# Patient Record
Sex: MALE | Race: WHITE | NOT HISPANIC OR LATINO | URBAN - METROPOLITAN AREA
[De-identification: names, ages, dates, MRNs, and addresses within clinical notes are randomized per-mention and may not be internally consistent; named-entity substitution may affect disease eponyms.]

---
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
## 2021-05-16 VITALS
TEMPERATURE: 98.7 F | HEART RATE: 103 BPM | WEIGHT: 255.25 LBS | BODY MASS INDEX: 42.53 KG/M2 | OXYGEN SATURATION: 96 % | HEIGHT: 65 IN | RESPIRATION RATE: 15 BRPM

## 2021-05-16 VITALS — BODY MASS INDEX: 42.68 KG/M2 | HEIGHT: 64 IN | WEIGHT: 250 LBS | TEMPERATURE: 97.9 F

## 2021-05-16 VITALS — WEIGHT: 256 LBS | TEMPERATURE: 98.5 F | RESPIRATION RATE: 16 BRPM | HEIGHT: 65 IN | BODY MASS INDEX: 42.65 KG/M2

## 2023-05-27 ENCOUNTER — APPOINTMENT (OUTPATIENT)
Dept: GENERAL RADIOLOGY | Facility: HOSPITAL | Age: 23
End: 2023-05-27
Payer: COMMERCIAL

## 2023-05-27 ENCOUNTER — APPOINTMENT (OUTPATIENT)
Dept: CT IMAGING | Facility: HOSPITAL | Age: 23
End: 2023-05-27
Payer: COMMERCIAL

## 2023-05-27 ENCOUNTER — HOSPITAL ENCOUNTER (EMERGENCY)
Facility: HOSPITAL | Age: 23
Discharge: HOME | End: 2023-05-27
Attending: EMERGENCY MEDICINE
Payer: COMMERCIAL

## 2023-05-27 VITALS
OXYGEN SATURATION: 98 % | HEART RATE: 86 BPM | HEIGHT: 65 IN | TEMPERATURE: 98.6 F | SYSTOLIC BLOOD PRESSURE: 123 MMHG | RESPIRATION RATE: 18 BRPM | WEIGHT: 240.52 LBS | DIASTOLIC BLOOD PRESSURE: 67 MMHG | BODY MASS INDEX: 40.07 KG/M2

## 2023-05-27 DIAGNOSIS — R55 SYNCOPE, UNSPECIFIED SYNCOPE TYPE: Primary | ICD-10-CM

## 2023-05-27 LAB
ALBUMIN SERPL-MCNC: 3.9 G/DL (ref 3.5–5.2)
ALBUMIN/GLOB SERPL: 1.3 G/DL
ALP SERPL-CCNC: 68 U/L (ref 39–117)
ALT SERPL W P-5'-P-CCNC: 41 U/L (ref 1–41)
AMPHET+METHAMPHET UR QL: NEGATIVE
ANION GAP SERPL CALCULATED.3IONS-SCNC: 10 MMOL/L (ref 5–15)
AST SERPL-CCNC: 37 U/L (ref 1–40)
BARBITURATES UR QL SCN: NEGATIVE
BASOPHILS # BLD AUTO: 0.01 10*3/MM3 (ref 0–0.2)
BASOPHILS NFR BLD AUTO: 0.1 % (ref 0–1.5)
BENZODIAZ UR QL SCN: NEGATIVE
BILIRUB SERPL-MCNC: 0.5 MG/DL (ref 0–1.2)
BILIRUB UR QL STRIP: NEGATIVE
BUN SERPL-MCNC: 14 MG/DL (ref 6–20)
BUN/CREAT SERPL: 13.7 (ref 7–25)
CALCIUM SPEC-SCNC: 9.3 MG/DL (ref 8.6–10.5)
CANNABINOIDS SERPL QL: NEGATIVE
CHLORIDE SERPL-SCNC: 104 MMOL/L (ref 98–107)
CLARITY UR: CLEAR
CO2 SERPL-SCNC: 25 MMOL/L (ref 22–29)
COCAINE UR QL: NEGATIVE
COLOR UR: YELLOW
CREAT SERPL-MCNC: 1.02 MG/DL (ref 0.76–1.27)
D DIMER PPP FEU-MCNC: 3.72 MCGFEU/ML (ref 0–0.5)
DEPRECATED RDW RBC AUTO: 38.4 FL (ref 37–54)
EGFRCR SERPLBLD CKD-EPI 2021: 107.2 ML/MIN/1.73
EOSINOPHIL # BLD AUTO: 0.01 10*3/MM3 (ref 0–0.4)
EOSINOPHIL NFR BLD AUTO: 0.1 % (ref 0.3–6.2)
ERYTHROCYTE [DISTWIDTH] IN BLOOD BY AUTOMATED COUNT: 12.8 % (ref 12.3–15.4)
FENTANYL UR-MCNC: NEGATIVE NG/ML
GLOBULIN UR ELPH-MCNC: 3.1 GM/DL
GLUCOSE SERPL-MCNC: 96 MG/DL (ref 65–99)
GLUCOSE UR STRIP-MCNC: NEGATIVE MG/DL
HCT VFR BLD AUTO: 47.1 % (ref 37.5–51)
HGB BLD-MCNC: 16.6 G/DL (ref 13–17.7)
HGB UR QL STRIP.AUTO: NEGATIVE
HOLD SPECIMEN: NORMAL
HOLD SPECIMEN: NORMAL
IMM GRANULOCYTES # BLD AUTO: 0.03 10*3/MM3 (ref 0–0.05)
IMM GRANULOCYTES NFR BLD AUTO: 0.3 % (ref 0–0.5)
KETONES UR QL STRIP: ABNORMAL
LEUKOCYTE ESTERASE UR QL STRIP.AUTO: NEGATIVE
LYMPHOCYTES # BLD AUTO: 2.29 10*3/MM3 (ref 0.7–3.1)
LYMPHOCYTES NFR BLD AUTO: 23.8 % (ref 19.6–45.3)
MAGNESIUM SERPL-MCNC: 2 MG/DL (ref 1.6–2.6)
MCH RBC QN AUTO: 29.2 PG (ref 26.6–33)
MCHC RBC AUTO-ENTMCNC: 35.2 G/DL (ref 31.5–35.7)
MCV RBC AUTO: 82.9 FL (ref 79–97)
METHADONE UR QL SCN: NEGATIVE
MONOCYTES # BLD AUTO: 0.38 10*3/MM3 (ref 0.1–0.9)
MONOCYTES NFR BLD AUTO: 3.9 % (ref 5–12)
NEUTROPHILS NFR BLD AUTO: 6.91 10*3/MM3 (ref 1.7–7)
NEUTROPHILS NFR BLD AUTO: 71.8 % (ref 42.7–76)
NITRITE UR QL STRIP: NEGATIVE
NRBC BLD AUTO-RTO: 0 /100 WBC (ref 0–0.2)
OPIATES UR QL: NEGATIVE
OXYCODONE UR QL SCN: NEGATIVE
PH UR STRIP.AUTO: 6 [PH] (ref 5–8)
PLATELET # BLD AUTO: 251 10*3/MM3 (ref 140–450)
PMV BLD AUTO: 9.3 FL (ref 6–12)
POTASSIUM SERPL-SCNC: 3.8 MMOL/L (ref 3.5–5.2)
PROT SERPL-MCNC: 7 G/DL (ref 6–8.5)
PROT UR QL STRIP: NEGATIVE
QT INTERVAL: 374 MS
QT INTERVAL: 379 MS
RBC # BLD AUTO: 5.68 10*6/MM3 (ref 4.14–5.8)
SODIUM SERPL-SCNC: 139 MMOL/L (ref 136–145)
SP GR UR STRIP: 1.02 (ref 1–1.03)
TROPONIN T SERPL HS-MCNC: 15 NG/L
TROPONIN T SERPL HS-MCNC: 19 NG/L
UROBILINOGEN UR QL STRIP: ABNORMAL
WBC NRBC COR # BLD: 9.63 10*3/MM3 (ref 3.4–10.8)
WHOLE BLOOD HOLD COAG: NORMAL
WHOLE BLOOD HOLD SPECIMEN: NORMAL

## 2023-05-27 PROCEDURE — 84484 ASSAY OF TROPONIN QUANT: CPT | Performed by: EMERGENCY MEDICINE

## 2023-05-27 PROCEDURE — 81003 URINALYSIS AUTO W/O SCOPE: CPT

## 2023-05-27 PROCEDURE — 80053 COMPREHEN METABOLIC PANEL: CPT | Performed by: EMERGENCY MEDICINE

## 2023-05-27 PROCEDURE — 71045 X-RAY EXAM CHEST 1 VIEW: CPT

## 2023-05-27 PROCEDURE — 80307 DRUG TEST PRSMV CHEM ANLYZR: CPT

## 2023-05-27 PROCEDURE — 36415 COLL VENOUS BLD VENIPUNCTURE: CPT

## 2023-05-27 PROCEDURE — 93005 ELECTROCARDIOGRAM TRACING: CPT

## 2023-05-27 PROCEDURE — 25510000001 IOPAMIDOL PER 1 ML: Performed by: EMERGENCY MEDICINE

## 2023-05-27 PROCEDURE — 93005 ELECTROCARDIOGRAM TRACING: CPT | Performed by: EMERGENCY MEDICINE

## 2023-05-27 PROCEDURE — 85025 COMPLETE CBC W/AUTO DIFF WBC: CPT | Performed by: EMERGENCY MEDICINE

## 2023-05-27 PROCEDURE — 83735 ASSAY OF MAGNESIUM: CPT | Performed by: EMERGENCY MEDICINE

## 2023-05-27 PROCEDURE — 99284 EMERGENCY DEPT VISIT MOD MDM: CPT

## 2023-05-27 PROCEDURE — 84484 ASSAY OF TROPONIN QUANT: CPT

## 2023-05-27 PROCEDURE — 85379 FIBRIN DEGRADATION QUANT: CPT

## 2023-05-27 PROCEDURE — 71260 CT THORAX DX C+: CPT

## 2023-05-27 RX ORDER — SODIUM CHLORIDE 0.9 % (FLUSH) 0.9 %
10 SYRINGE (ML) INJECTION AS NEEDED
Status: DISCONTINUED | OUTPATIENT
Start: 2023-05-27 | End: 2023-05-27 | Stop reason: HOSPADM

## 2023-05-27 RX ADMIN — SODIUM CHLORIDE 1000 ML: 9 INJECTION, SOLUTION INTRAVENOUS at 12:31

## 2023-05-27 RX ADMIN — IOPAMIDOL 100 ML: 755 INJECTION, SOLUTION INTRAVENOUS at 17:33

## 2023-05-27 NOTE — ED PROVIDER NOTES
Time: 12:10 PM EDT  Date of encounter:  5/27/2023  Independent Historian/Clinical History and Information was obtained by:   Patient  Chief Complaint   Patient presents with   • Syncope       History is limited by: N/A    History of Present Illness:  Patient is a 22 y.o. year old male who presents to the emergency department for evaluation of syncopal episode that occurred today.  Patient states that he originally was going to the restroom having a bowel movement and when he sat down he got dizzy.  He denies straining or being constipated.  He states that afterwards he stood up and he was fine but then when he was walking he felt lightheaded again.  Patient states that he yelled for his dad once he was outside by his truck and his dad came over and he felt lightheaded and his father states that he had a syncopal episode where caught him and lowered him to the ground. Father states he was unconscious for maybe 5 seconds and that he looked pale prior to.  He did not hit his head.  Patient states that this has not happened in the past.  He does have a history of sleep apnea but does not use a breathing machine.  He denies alcohol or drug use.  He denies nausea or vomiting  HPI    Patient Care Team  Primary Care Provider: Provider, No Known    Past Medical History:     No Known Allergies  History reviewed. No pertinent past medical history.  History reviewed. No pertinent surgical history.  History reviewed. No pertinent family history.    Home Medications:  Prior to Admission medications    Not on File        Social History:          Review of Systems:  Review of Systems   Constitutional: Negative.    HENT: Negative.    Eyes: Negative.    Respiratory: Negative.    Cardiovascular: Negative.    Gastrointestinal: Negative.  Negative for nausea and vomiting.   Endocrine: Negative.    Genitourinary: Negative.    Musculoskeletal: Negative.    Skin: Negative.    Allergic/Immunologic: Negative.    Neurological: Positive for  "dizziness and syncope.   Hematological: Negative.    Psychiatric/Behavioral: Negative.         Physical Exam:  /67   Pulse 66   Temp 98.6 °F (37 °C) (Oral)   Resp 18   Ht 165.1 cm (65\")   Wt 109 kg (240 lb 8.4 oz)   SpO2 94%   BMI 40.02 kg/m²     Physical Exam  Vitals and nursing note reviewed.   Constitutional:       Appearance: Normal appearance. He is obese.   HENT:      Head: Normocephalic and atraumatic.      Nose: Nose normal.      Mouth/Throat:      Mouth: Mucous membranes are moist.   Eyes:      Extraocular Movements: Extraocular movements intact.      Conjunctiva/sclera: Conjunctivae normal.      Pupils: Pupils are equal, round, and reactive to light.   Cardiovascular:      Rate and Rhythm: Normal rate and regular rhythm.      Heart sounds: Normal heart sounds.   Pulmonary:      Effort: Pulmonary effort is normal.      Breath sounds: Normal breath sounds.   Musculoskeletal:         General: Normal range of motion.      Cervical back: Normal range of motion and neck supple.   Skin:     General: Skin is warm and dry.   Neurological:      General: No focal deficit present.      Mental Status: He is alert and oriented to person, place, and time.   Psychiatric:         Mood and Affect: Mood normal.         Behavior: Behavior normal.                  Procedures:  Procedures      Medical Decision Making:      Comorbidities that affect care:    Obesity    External Notes reviewed:    Previous Clinic Note: ENT office visit for sleep apnea management      The following orders were placed and all results were independently analyzed by me:  Orders Placed This Encounter   Procedures   • XR Chest 1 View   • CT Chest With Contrast Diagnostic   • [place] Draw   • Comprehensive Metabolic Panel   • Magnesium   • Single High Sensitivity Troponin T   • CBC Auto Differential   • Urinalysis With Microscopic If Indicated (No Culture) - Urine, Clean Catch   • Urine Drug Screen - Urine, Clean Catch   • Single High " Sensitivity Troponin T   • D-dimer, Quantitative   • NPO Diet NPO Type: Strict NPO   • Undress & Gown   • Continuous Pulse Oximetry   • Vital Signs   • Orthostatic Blood Pressure   • Oxygen Therapy- Nasal Cannula; Titrate 1-6 LPM Per SpO2; 90 - 95%   • POC Glucose Once   • ECG 12 Lead ED Triage Standing Order; Syncope   • ECG 12 Lead Other; elevated trop   • Insert Peripheral IV   • CBC & Differential   • Green Top (Gel)   • Lavender Top   • Gold Top - SST   • Light Blue Top       Medications Given in the Emergency Department:  Medications   sodium chloride 0.9 % flush 10 mL (has no administration in time range)   sodium chloride 0.9 % bolus 1,000 mL (0 mL Intravenous Stopped 5/27/23 1301)   iopamidol (ISOVUE-370) 76 % injection 100 mL (100 mL Intravenous Given 5/27/23 1733)        ED Course:    The patient was initially evaluated in the triage area where orders were placed. The patient was later dispositioned by Santo Webber MD.      The patient was advised to stay for completion of workup which includes but is not limited to communication of labs and radiological results, reassessment and plan. The patient was advised that leaving prior to disposition by a provider could result in critical findings that are not communicated to the patient.     ED Course as of 05/27/23 1755   Sat May 27, 2023   1154 Single High Sensitivity Troponin T [AJ]   1521 Discussed elevated Trop with Dr. Larson, he recommends a D-dimer. [AJ]   1521 POC Glucose Once [AJ]   1634 Ddimer elevated at 3.72, CT chest ordered. Spoke to the patient and his dad about next steps.  [AJ]      ED Course User Index  [AJ] Jose G Yanez, AZAM       Labs:    Lab Results (last 24 hours)     Procedure Component Value Units Date/Time    CBC & Differential [[id number]]  (Abnormal) Collected: 05/27/23 1020    Specimen: Blood Updated: 05/27/23 1030    Narrative:      The following orders were created for panel order CBC & Differential.  Procedure                                Abnormality         Status                     ---------                               -----------         ------                     CBC Auto Differential[[id number]]        Abnormal            Final result                 Please view results for these tests on the individual orders.    CBC Auto Differential [[id number]]  (Abnormal) Collected: 05/27/23 1020    Specimen: Blood Updated: 05/27/23 1030     WBC 9.63 10*3/mm3      RBC 5.68 10*6/mm3      Hemoglobin 16.6 g/dL      Hematocrit 47.1 %      MCV 82.9 fL      MCH 29.2 pg      MCHC 35.2 g/dL      RDW 12.8 %      RDW-SD 38.4 fl      MPV 9.3 fL      Platelets 251 10*3/mm3      Neutrophil % 71.8 %      Lymphocyte % 23.8 %      Monocyte % 3.9 %      Eosinophil % 0.1 %      Basophil % 0.1 %      Immature Grans % 0.3 %      Neutrophils, Absolute 6.91 10*3/mm3      Lymphocytes, Absolute 2.29 10*3/mm3      Monocytes, Absolute 0.38 10*3/mm3      Eosinophils, Absolute 0.01 10*3/mm3      Basophils, Absolute 0.01 10*3/mm3      Immature Grans, Absolute 0.03 10*3/mm3      nRBC 0.0 /100 WBC     D-dimer, Quantitative [[id number]]  (Abnormal) Collected: 05/27/23 1020    Specimen: Blood Updated: 05/27/23 1629     D-Dimer, Quantitative 3.72 MCGFEU/mL     Narrative:      According to the assay 's published package insert, a normal (<0.50 MCGFEU/mL) D-dimer result in conjunction with a non-high clinical probability assessment, excludes deep vein thrombosis (DVT) and pulmonary embolism (PE) with high sensitivity.    D-dimer values increase with age and this can make VTE exclusion of an older population difficult. To address this, the American College of [employer], based on best available evidence and recent guidelines, recommends that clinicians use age-adjusted D-dimer thresholds in patients greater than 50 years of age with: a) a low probability of PE who do not meet all Pulmonary Embolism Rule Out Criteria, or b) in those with intermediate probability of  "PE.   The formula for an age-adjusted D-dimer cut-off is \"age/100\".  For example, a 60 year old patient would have an age-adjusted cut-off of 0.60 MCGFEU/mL and an 80 year old 0.80 MCGFEU/mL.    Comprehensive Metabolic Panel [[id number]] Collected: 05/27/23 1111    Specimen: Blood Updated: 05/27/23 1143     Glucose 96 mg/dL      BUN 14 mg/dL      Creatinine 1.02 mg/dL      Sodium 139 mmol/L      Potassium 3.8 mmol/L      Chloride 104 mmol/L      CO2 25.0 mmol/L      Calcium 9.3 mg/dL      Total Protein 7.0 g/dL      Albumin 3.9 g/dL      ALT (SGPT) 41 U/L      AST (SGOT) 37 U/L      Alkaline Phosphatase 68 U/L      Total Bilirubin 0.5 mg/dL      Globulin 3.1 gm/dL      A/G Ratio 1.3 g/dL      BUN/Creatinine Ratio 13.7     Anion Gap 10.0 mmol/L      eGFR 107.2 mL/min/1.73     Narrative:      GFR Normal >60  Chronic Kidney Disease <60  Kidney Failure <15      Magnesium [[id number]]  (Normal) Collected: 05/27/23 1111    Specimen: Blood Updated: 05/27/23 1143     Magnesium 2.0 mg/dL     Single High Sensitivity Troponin T [[id number]]  (Abnormal) Collected: 05/27/23 1111    Specimen: Blood Updated: 05/27/23 1143     HS Troponin T 15 ng/L     Narrative:      High Sensitive Troponin T Reference Range:  <10.0 ng/L- Negative Female for AMI  <15.0 ng/L- Negative Male for AMI  >=10 - Abnormal Female indicating possible myocardial injury.  >=15 - Abnormal Male indicating possible myocardial injury.   Clinicians would have to utilize clinical acumen, EKG, Troponin, and serial changes to determine if it is an Acute Myocardial Infarction or myocardial injury due to an underlying chronic condition.         Single High Sensitivity Troponin T [[id number]]  (Abnormal) Collected: 05/27/23 1324    Specimen: Blood Updated: 05/27/23 1359     HS Troponin T 19 ng/L     Narrative:      High Sensitive Troponin T Reference Range:  <10.0 ng/L- Negative Female for AMI  <15.0 ng/L- Negative Male for AMI  >=10 - Abnormal Female indicating possible " myocardial injury.  >=15 - Abnormal Male indicating possible myocardial injury.   Clinicians would have to utilize clinical acumen, EKG, Troponin, and serial changes to determine if it is an Acute Myocardial Infarction or myocardial injury due to an underlying chronic condition.         Urinalysis With Microscopic If Indicated (No Culture) - Urine, Clean Catch [[id number]]  (Abnormal) Collected: 05/27/23 1325    Specimen: Urine, Clean Catch Updated: 05/27/23 1354     Color, UA Yellow     Appearance, UA Clear     pH, UA 6.0     Specific Gravity, UA 1.022     Glucose, UA Negative     Ketones, UA Trace     Bilirubin, UA Negative     Blood, UA Negative     Protein, UA Negative     Leuk Esterase, UA Negative     Nitrite, UA Negative     Urobilinogen, UA 1.0 E.U./dL    Narrative:      Urine microscopic not indicated.    Urine Drug Screen - Urine, Clean Catch [[id number]]  (Normal) Collected: 05/27/23 1325    Specimen: Urine, Clean Catch Updated: 05/27/23 1407     Amphet/Methamphet, Screen Negative     Barbiturates Screen, Urine Negative     Benzodiazepine Screen, Urine Negative     Cocaine Screen, Urine Negative     Opiate Screen Negative     THC, Screen, Urine Negative     Methadone Screen, Urine Negative     Oxycodone Screen, Urine Negative     Fentanyl, Urine Negative    Narrative:      Negative Thresholds Per Drugs Screened:    Amphetamines                 500 ng/ml  Barbiturates                 200 ng/ml  Benzodiazepines              100 ng/ml  Cocaine                      300 ng/ml  Methadone                    300 ng/ml  Opiates                      300 ng/ml  Oxycodone                    100 ng/ml  THC                           50 ng/ml  Fentanyl                       5 ng/ml      The Normal Value for all drugs tested is negative. This report includes final unconfirmed screening results to be used for medical treatment purposes only. Unconfirmed results must not be used for non-medical purposes such as employment or  legal testing. Clinical consideration should be applied to any drug of abuse test, particularly when unconfirmed results are used.                   Imaging:    CT Chest With Contrast Diagnostic    Result Date: 5/27/2023  PROCEDURE: CT CHEST W CONTRAST DIAGNOSTIC  COMPARISON:  [facility], CT, ABDOMEN/PELVIS WITH CONTRAST, 3/17/2014, 12:32. INDICATIONS: SYNCOPE, ELEVATED D DIMER.  TECHNIQUE: After obtaining the patient's consent, CT images were obtained with non-ionic intravenous contrast material.   PROTOCOL:   Standard imaging protocol performed    RADIATION:   DLP: 744.3mGy*cm   Automated exposure control was utilized to minimize radiation dose. CONTRAST: 80cc Isovue 370 I.V. LABS:   eGFR: >60ml/min/1.73m2  FINDINGS:  There are no filling defects within the pulmonary arteries to indicate acute pulmonary embolic disease.  The heart size is normal.  There are no enlarged hilar or mediastinal lymph nodes.  The lungs and pleural spaces are clear of active disease.  The subdiaphragmatic regions are unremarkable.  There are no suspicious osteolytic or sclerotic lesions within the bony thorax.        1. No acute pulmonary embolic disease. 2. No active pulmonary disease.     KIMBERLY ONEILL MD       Electronically Signed and Approved By: KIMBERLY ONEILL MD on 5/27/2023 at 17:44             XR Chest 1 View    Result Date: 5/27/2023  PROCEDURE: XR CHEST 1 VW  COMPARISON: [facility], CR, CHEST PA/AP & LAT 2V, 12/04/2017, 7:13.  INDICATIONS: syncope  FINDINGS:  The cardiac silhouette is normal. The pulmonary vascular markings are normal. The lungs and pleural spaces are clear. The bony thorax is unremarkable.       Normal examination.        KIMBERLY ONEILL MD       Electronically Signed and Approved By: KIMBERLY ONEILL MD on 5/27/2023 at 14:52                 Differential Diagnosis and Discussion:      Syncope: Differential diagnosis includes but is not limited to TIA, hyperventilation, aortic stenosis, pulmonary  emboli, myocardial disease, bradycardia arrhythmia, heart block, tachyarrhythmia, vasovagal, orthostatic hypotension, ruptured AAA, aortic dissection, subarachnoid hemorrhage, seizure, hypoglycemia.    All labs were reviewed and interpreted by me.  All X-rays impressions were independently interpreted by me.  EKG was interpreted by me.  CT scan radiology impression was interpreted by me.    MDM  Number of Diagnoses or Management Options  Syncope, unspecified syncope type  Diagnosis management comments: In summary this is a 22-year-old male who presents to the emergency department for evaluation of syncopal episode.  Patient reports preceding lightheadedness but denies chest pain, shortness of breath just prior.  Chest x-ray unremarkable CT chest negative for pulmonary embolism and overall unremarkable for acute pathology.  CBC independently reviewed by me and shows no critical abnormalities.  CMP independently reviewed by me and shows no critical abnormalities.  High-sensitivity troponin within range.  No significant EKG changes.  Patient is low risk [place] syncope risk.  Very strict return to ER and follow-up instructions have been provided to the patient.         Amount and/or Complexity of Data Reviewed  Clinical lab tests: reviewed  Tests in the radiology section of CPT®: reviewed  Tests in the medicine section of CPT®: reviewed             Patient Care Considerations:    CT HEAD: I considered ordering a noncontrast CT of the head, however Patient denies hitting head.  Denies headache.      Consultants/Shared Management Plan:    None    Social Determinants of Health:    Patient is independent, reliable, and has access to care.       Disposition and Care Coordination:    Discharged: I considered escalation of care by admitting this patient for observation, however the patient has improved and is suitable and  stable for discharge.    I have explained the patient´s condition, diagnoses and treatment plan based  on the information available to me at this time. I have answered questions and addressed any concerns. The patient has a good  understanding of the patient´s diagnosis, condition, and treatment plan as can be expected at this point. The vital signs have been stable. The patient´s condition is stable and appropriate for discharge from the emergency department.      The patient will pursue further outpatient evaluation with the primary care physician or other designated or consulting physician as outlined in the discharge instructions. They are agreeable to this plan of care and follow-up instructions have been explained in detail. The patient has received these instructions in written format and have expressed an understanding of the discharge instructions. The patient is aware that any significant change in condition or worsening of symptoms should prompt an immediate return to this or the closest emergency department or call to 911.  I have explained discharge medications and the need for follow up with the patient/caretakers. This was also printed in the discharge instructions. Patient was discharged with the following medications and follow up:      Medication List      No changes were made to your prescriptions during this visit.      Provider, No Known  [facility]  [place] [address]    In 1 week         Final diagnoses:   Syncope, unspecified syncope type        ED Disposition     ED Disposition   Discharge    Condition   Stable    Comment   --             This medical record created using voice recognition software.           Santo Webber MD  05/27/23 6318

## 2023-06-02 ENCOUNTER — OFFICE VISIT (OUTPATIENT)
Dept: FAMILY MEDICINE CLINIC | Facility: CLINIC | Age: 23
End: 2023-06-02

## 2023-06-02 ENCOUNTER — PATIENT ROUNDING (BHMG ONLY) (OUTPATIENT)
Dept: FAMILY MEDICINE CLINIC | Facility: CLINIC | Age: 23
End: 2023-06-02

## 2023-06-02 VITALS
OXYGEN SATURATION: 96 % | WEIGHT: 240.2 LBS | BODY MASS INDEX: 40.02 KG/M2 | TEMPERATURE: 97.4 F | HEIGHT: 65 IN | HEART RATE: 55 BPM | SYSTOLIC BLOOD PRESSURE: 130 MMHG | DIASTOLIC BLOOD PRESSURE: 80 MMHG | RESPIRATION RATE: 16 BRPM

## 2023-06-02 DIAGNOSIS — F84.0 AUTISM: ICD-10-CM

## 2023-06-02 DIAGNOSIS — Z76.89 ENCOUNTER TO ESTABLISH CARE: Primary | ICD-10-CM

## 2023-06-02 DIAGNOSIS — M54.50 CHRONIC BILATERAL LOW BACK PAIN WITHOUT SCIATICA: ICD-10-CM

## 2023-06-02 DIAGNOSIS — F41.9 ANXIETY: ICD-10-CM

## 2023-06-02 DIAGNOSIS — Z99.89 OSA ON CPAP: ICD-10-CM

## 2023-06-02 DIAGNOSIS — G47.33 OSA ON CPAP: ICD-10-CM

## 2023-06-02 DIAGNOSIS — G89.29 CHRONIC BILATERAL LOW BACK PAIN WITHOUT SCIATICA: ICD-10-CM

## 2023-06-02 RX ORDER — HYDROXYZINE HYDROCHLORIDE 10 MG/1
10 TABLET, FILM COATED ORAL EVERY 8 HOURS PRN
Qty: 60 TABLET | Refills: 1 | Status: SHIPPED | OUTPATIENT
Start: 2023-06-02

## 2023-06-02 RX ORDER — HYDROXYZINE HYDROCHLORIDE 10 MG/1
TABLET, FILM COATED ORAL
COMMUNITY
End: 2023-06-02 | Stop reason: SDUPTHER

## 2023-06-02 RX ORDER — DULOXETIN HYDROCHLORIDE 30 MG/1
30 CAPSULE, DELAYED RELEASE ORAL DAILY
Qty: 30 CAPSULE | Refills: 2 | Status: SHIPPED | OUTPATIENT
Start: 2023-06-02

## 2023-06-02 NOTE — PROGRESS NOTES
"Chief Complaint  Establishing care for management of anxiety/depression and possible obstructive sleep apnea    Subjective         Landen Woodard is a 22 y.o. male who presents to [facility]    22 years old comes to the clinic today to establish care.    Mother is present in the room.    Patient recently visited ER for vasovagal syncope, patient had complete work-up done with no concerning findings.    Patient reports no prior episodes of vasovagal/syncope or any episodes since ER visit.    Mother reports long history of anxiety and depression, patient used to take hydroxyzine.  Patient does feel some anxiety and depressed mood, was diagnosed with autism spectrum disorder in the past.  Denies any SI/HI,    Patient is working currently, has been trying to lose weight and has lost significant weight within the last 6 months.  Patient does report strong family history of obstructive sleep apnea and does report snoring, would like to get evaluation for obstructive sleep apnea.  Patient does report occasional lower back discomfort but has been improving since he has lost some weight.    Denies any chest pain or shortness of breath on exertion, 12+ review of systems are unremarkable otherwise.    Objective   Vital Signs:   Vitals:    06/02/23 0725   BP: 130/80   BP Location: Left arm   Patient Position: Sitting   Cuff Size: Large Adult   Pulse: 55   Resp: 16   Temp: 97.4 °F (36.3 °C)   TempSrc: Temporal   SpO2: 96%   Weight: 109 kg (240 lb 3.2 oz)   Height: 165.1 cm (65\")      Body mass index is 39.97 kg/m².   Wt Readings from Last 3 Encounters:   06/02/23 109 kg (240 lb 3.2 oz)   05/27/23 109 kg (240 lb 8.4 oz)   02/07/19 116 kg (256 lb) (>99 %, Z= 2.64)*     * Growth percentiles are based on CDC (Boys, 2-20 Years) data.      BP Readings from Last 3 Encounters:   06/02/23 130/80   05/27/23 123/67        Patient Care Team:  Lisa Chowdhury MD as PCP - General (Family Medicine) "     Physical Exam  Vitals reviewed.   Constitutional:       Appearance: Normal appearance. He is well-developed.   HENT:      Head: Normocephalic and atraumatic.      Right Ear: External ear normal.      Left Ear: External ear normal.      Mouth/Throat:      Pharynx: No oropharyngeal exudate.   Eyes:      Conjunctiva/sclera: Conjunctivae normal.      Pupils: Pupils are equal, round, and reactive to light.   Cardiovascular:      Rate and Rhythm: Normal rate and regular rhythm.      Heart sounds: No murmur heard.    No friction rub. No gallop.   Pulmonary:      Effort: Pulmonary effort is normal.      Breath sounds: Normal breath sounds. No wheezing or rhonchi.   Abdominal:      General: Bowel sounds are normal. There is no distension.      Palpations: Abdomen is soft.      Tenderness: There is no abdominal tenderness.   Skin:     General: Skin is warm and dry.   Neurological:      Mental Status: He is alert and oriented to person, place, and time.      Cranial Nerves: No cranial nerve deficit.   Psychiatric:         Mood and Affect: Mood and affect normal.         Behavior: Behavior normal.         Thought Content: Thought content normal.         Judgment: Judgment normal.                 Assessment and Plan   Diagnoses and all orders for this visit:    1. Encounter to establish care (Primary)    2. Anxiety  Comments:  We will start patient on Cymbalta and hydroxyzine as needed.  Further interventions if not controlled symptoms or any worsening.  Orders:  -     DULoxetine (Cymbalta) 30 MG capsule; Take 1 capsule by mouth Daily.  Dispense: 30 capsule; Refill: 2  -     hydrOXYzine (ATARAX) 10 MG tablet; Take 1 tablet by mouth Every 8 (Eight) Hours As Needed for Anxiety.  Dispense: 60 tablet; Refill: 1    3. Chronic bilateral low back pain without sciatica  Comments:  Conservative management discussed, may consider imaging work-up/further work-up if needed     4. RAMA on CPAP  Comments:  Sleep medicine  consult  Orders:  -     Ambulatory Referral to Sleep Medicine    5. Autism          Tobacco Use: Unknown   • Smoking Tobacco Use: Never   • Smokeless Tobacco Use: Unknown   • Passive Exposure: Not on file            Follow Up   Return in about 3 months (around 9/2/2023) for Annual physical.  Patient was given instructions and counseling regarding his condition or for health maintenance advice. Please see specific information pulled into the AVS if appropriate.

## 2023-06-05 LAB
QT INTERVAL: 374 MS
QT INTERVAL: 379 MS

## 2023-06-08 ENCOUNTER — TELEPHONE (OUTPATIENT)
Dept: FAMILY MEDICINE CLINIC | Facility: CLINIC | Age: 23
End: 2023-06-08
Payer: COMMERCIAL

## 2023-06-08 NOTE — TELEPHONE ENCOUNTER
HUB TO READ AND SHARE    Was calling to get patient set up with pranav hurt for new patient appt. Pranav is out of office until August.

## 2023-06-08 NOTE — TELEPHONE ENCOUNTER
Caller: Landen Woodard    Relationship to patient: Self    Best call back number: [phone]     Patient is needing: PATIENT IS REQUESTING TO CHANGE PROVIDERS FROM DR. MENDEZ TO LADI GONZALEZ. PLEASE CALL TO ADVISE.

## 2023-08-01 ENCOUNTER — OFFICE VISIT (OUTPATIENT)
Dept: FAMILY MEDICINE CLINIC | Facility: CLINIC | Age: 23
End: 2023-08-01
Payer: COMMERCIAL

## 2023-08-01 VITALS
HEIGHT: 65 IN | SYSTOLIC BLOOD PRESSURE: 132 MMHG | OXYGEN SATURATION: 100 % | BODY MASS INDEX: 40.47 KG/M2 | HEART RATE: 75 BPM | TEMPERATURE: 97.8 F | DIASTOLIC BLOOD PRESSURE: 62 MMHG | WEIGHT: 242.9 LBS

## 2023-08-01 DIAGNOSIS — Z76.89 ESTABLISHING CARE WITH NEW DOCTOR, ENCOUNTER FOR: Primary | ICD-10-CM

## 2023-08-01 DIAGNOSIS — Z99.89 OSA ON CPAP: ICD-10-CM

## 2023-08-01 DIAGNOSIS — Z00.00 ANNUAL PHYSICAL EXAM: ICD-10-CM

## 2023-08-01 DIAGNOSIS — R55 SYNCOPE, UNSPECIFIED SYNCOPE TYPE: ICD-10-CM

## 2023-08-01 DIAGNOSIS — F41.9 ANXIETY: ICD-10-CM

## 2023-08-01 DIAGNOSIS — G47.33 OSA ON CPAP: ICD-10-CM

## 2023-08-01 DIAGNOSIS — F84.0 AUTISM: ICD-10-CM

## 2023-08-08 ENCOUNTER — PATIENT ROUNDING (BHMG ONLY) (OUTPATIENT)
Dept: FAMILY MEDICINE CLINIC | Facility: CLINIC | Age: 23
End: 2023-08-08
Payer: COMMERCIAL

## 2023-08-22 ENCOUNTER — OFFICE VISIT (OUTPATIENT)
Dept: SLEEP MEDICINE | Facility: HOSPITAL | Age: 23
End: 2023-08-22
Payer: COMMERCIAL

## 2023-08-22 VITALS — HEART RATE: 52 BPM | HEIGHT: 65 IN | OXYGEN SATURATION: 99 % | BODY MASS INDEX: 40.48 KG/M2 | WEIGHT: 243 LBS

## 2023-08-22 DIAGNOSIS — G47.33 OBSTRUCTIVE SLEEP APNEA, ADULT: Primary | ICD-10-CM

## 2023-08-22 DIAGNOSIS — E66.01 CLASS 3 SEVERE OBESITY WITH BODY MASS INDEX (BMI) OF 40.0 TO 44.9 IN ADULT, UNSPECIFIED OBESITY TYPE, UNSPECIFIED WHETHER SERIOUS COMORBIDITY PRESENT: ICD-10-CM

## 2023-08-22 PROCEDURE — G0463 HOSPITAL OUTPT CLINIC VISIT: HCPCS

## 2023-08-22 NOTE — PROGRESS NOTES
[address] [address]  Phone: [phone]  Fax: [phone]      Landen Woodard  [id number]   2000  22 y.o.  male      PCP:Alanis Puente APRN  Referring provider: Dr. Lisa Chowdhury    Type of service: Initial New Patient Office Visit  Date of service: 8/22/2023          CHIEF COMPLAINT: Obstructive sleep apnea      HISTORY OF PRESENT ILLNESS:  Landen Woodard 22 y.o.  presents to the clinic today as a new patient to me and was seen to establish care for the treatment and management of obstructive sleep apnea. Patient had a sleep study around 2017 which showed mild obstructive sleep apnea with AHI of 12.1/h.  It was recommended that he see ENT provider for possible tonsil and adenoid removal.  Patient reports he was told he was not a candidate for these to be removed.  Weight was 246 pounds at time of the 2017 sleep study, no significant changes since previous study.  He continues to have nonrestorative sleep.  He did see ENT in the past and I have reviewed note and it was not felt that he had significant tonsillar hypertrophy that would be contributing to airway issues.  It was recommended that he proceed with CPAP trial and if he failed that then could consider following back up with ENT for drug-induced sleep endoscopy for further evaluation of airway.  Patient would like to proceed with CPAP therapy.        PATIENT HISTORY:  Sleep schedule:  Bedtime: 9 to 10 PM  Wake time: 30-7 a.m.  Normally takes 30 minutes to fall asleep  Average hours of sleep: 8.5-9 or more  Number of naps per day: 0    Symptoms:  In addition to the above, patient reports:   Have you ever awakened gasping for breath, coughing, choking: Yes   Change in weight:  No   Morning headaches:  No   Awaken with a sore throat or dry mouth:  No   Leg jerking at night:  No   Creepy crawly feeling in legs/urge to move legs: No   Teeth grinding: No   Have you ever awakened at  "night with a sour taste or burning sensation in your chest:  Yes   Do you have muscle weakness with laughing or anger:  No   Have you ever felt paralyzed while going to sleep or waking up:  No   Sleepwalking: No   Nightmares: No   Nocturia (urination at night): 0 times per night  Memory Problem: No     Past medical history: (Relevant to sleep medicine)  Anxiety  History of syncope      Social history:  Do you drive a commercial vehicle:  No   Shift work:  No   Tobacco use:  No  Alcohol use:  0 per week  Caffeinated drinks: 2 per day      Family history (parents, siblings, children) (relevant to sleep medicine):  Thyroid disorder    Medications: reviewed     ALLERGIES: Patient has no known allergies.        REVIEW OF SYSTEMS:  Full review of systems available on the intake form which is scanned in the media tab.  The relevant positives are noted below:  [place] Sleepiness Scale: Total score: 1   Fatigue  Snoring        PHYSICAL EXAM:  Vitals:    08/22/23 1300   Pulse: 52   SpO2: 99%   Weight: 110 kg (243 lb)   Height: 165.1 cm (65\")    Body mass index is 40.44 kg/mý. Neck Circumference: 18.5 inches  HEAD: atraumatic, normocephalic  THROAT: Mallampati class IV  NECK: Neck Circumference: 18.5 inches, trachea is in the midline  RESPIRATORY SYSTEM: Respirations even, unlabored, normal rate  CARDIOVASULAR SYSTEM: Normal rate  EXTREMITES: No cyanosis or clubbing  NEUROLOGICAL SYSTEM: Alert and oriented x 3, no gross motor defects, gait normal    Office notes from care team reviewed. Office note(s) dated 6/2/2023, reviewed.      Labs/ Test Results Reviewed:      Reviewed 7/2023 Holter, 7/2023 echo              ASSESSMENT AND PLAN:   Obstructive sleep apnea: AHI was 12.1/h.  No significant weight changes since previous study.  ENT provider did not feel that tonsils were significantly enlarged and did not feel that patient would likely benefit from removal.  Discussed pathophysiology of obstructive sleep apnea as well as " risks of untreated sleep apnea.  Patient verbalized understanding.  Patient would like to proceed with PAP therapy.  Order placed.  He will follow-up within 30 to 90 days of initiating Pap use or call sooner for issues or concerns.  Obesity: Body mass index is 40.44 kg/mý.. Patients who are overweight or obese are at increased risk of sleep apnea/ sleep disordered breathing. Weight reduction and healthy lifestyle are encouraged in overweight/ obese patients as part of a comprehensive approach to sleep apnea treatment.    Anxiety  History of syncope    Patient will follow-up 31 to 90 days after PAP therapy or sooner for issues or concerns.  Patient's questions were answered.        Thank you for allowing me to participate in the care of this patient.    Lilian Toussaint DNP, APRN  [facility]

## 2023-08-31 ENCOUNTER — PATIENT ROUNDING (BHMG ONLY) (OUTPATIENT)
Dept: FAMILY MEDICINE CLINIC | Facility: CLINIC | Age: 23
End: 2023-08-31
Payer: COMMERCIAL

## 2023-09-01 PROBLEM — F41.1 GENERALIZED ANXIETY DISORDER: Status: ACTIVE | Noted: 2023-09-01

## 2023-09-01 PROBLEM — F40.10 SOCIAL ANXIETY DISORDER: Status: ACTIVE | Noted: 2023-09-01

## 2023-09-01 PROBLEM — J35.3 ADENOTONSILLAR HYPERTROPHY: Status: ACTIVE | Noted: 2023-09-01

## 2023-09-01 PROBLEM — F84.5 ASPERGER'S SYNDROME: Status: ACTIVE | Noted: 2023-09-01

## 2023-09-01 PROBLEM — I49.1 PREMATURE ATRIAL COMPLEXES: Status: ACTIVE | Noted: 2023-09-01

## 2023-09-01 PROBLEM — G47.33 OSA (OBSTRUCTIVE SLEEP APNEA): Status: ACTIVE | Noted: 2023-09-01

## 2023-09-07 ENCOUNTER — OFFICE VISIT (OUTPATIENT)
Dept: CARDIOLOGY | Facility: CLINIC | Age: 23
End: 2023-09-07
Payer: COMMERCIAL

## 2023-09-07 VITALS
BODY MASS INDEX: 40.48 KG/M2 | HEART RATE: 62 BPM | SYSTOLIC BLOOD PRESSURE: 124 MMHG | WEIGHT: 243 LBS | HEIGHT: 65 IN | DIASTOLIC BLOOD PRESSURE: 65 MMHG

## 2023-09-07 DIAGNOSIS — I49.1 PREMATURE ATRIAL COMPLEXES: Primary | ICD-10-CM

## 2023-09-07 NOTE — PROGRESS NOTES
"Chief Complaint  Follow-up    Subjective            History of Present Illness  Landen Woodard is a 22-year-old white/ male patient who presents to the office today for follow-up.  He was seen by Dr. Bright on 7/7/2023 for complaint of dizziness and syncopal episode.  At that time 48-hour Holter monitor and echocardiogram were ordered.  The Holter monitor showed some PACs.  The echocardiogram showed normal heart function and no significant valvular abnormalities.  Today he denies any recurrence in syncope.  He denies any chest pain, shortness of breath, lightheadedness/dizziness, palpitations, or edema.    PMH  Past Medical History:   Diagnosis Date    Anxiety     Sleep apnea 8/25/18         ALLERGY  No Known Allergies       SURGICALHX  No past surgical history on file.       SOC  Social History     Socioeconomic History    Marital status: Single   Tobacco Use    Smoking status: Never    Smokeless tobacco: Never   Vaping Use    Vaping Use: Never used   Substance and Sexual Activity    Alcohol use: Never    Drug use: Never    Sexual activity: Not Currently         FAMHX  Family History   Problem Relation Age of Onset    Thyroid disease Mother     Cancer Father     Diabetes Father     Heart disease Father     Hyperlipidemia Father     Heart attack Father           MEDSIGONLY  Current Outpatient Medications on File Prior to Visit   Medication Sig    hydrOXYzine (ATARAX) 10 MG tablet Take 1 tablet by mouth Every 8 (Eight) Hours As Needed for Anxiety.     No current facility-administered medications on file prior to visit.     Objective   /65 (BP Location: Left arm, Patient Position: Sitting, Cuff Size: Adult)   Pulse 62   Ht 165.1 cm (65\")   Wt 110 kg (243 lb)   BMI 40.44 kg/m²       Physical Exam  Constitutional:       Appearance: He is obese.   HENT:      Head: Normocephalic.   Neck:      Vascular: No carotid bruit.   Cardiovascular:      Rate and Rhythm: Normal rate and regular rhythm.      " Pulses: Normal pulses.      Heart sounds: Normal heart sounds. No murmur heard.  Pulmonary:      Effort: Pulmonary effort is normal.      Breath sounds: Normal breath sounds.   Musculoskeletal:      Cervical back: Neck supple.      Right lower leg: No edema.      Left lower leg: No edema.   Skin:     General: Skin is dry.   Neurological:      Mental Status: He is alert and oriented to person, place, and time.   Psychiatric:         Behavior: Behavior normal.     Result Review :   The following data was reviewed by: RYNE Hallman on 09/07/2023:  No results found for: PROBNP  CMP          5/27/2023    11:11   CMP   Glucose 96    BUN 14    Creatinine 1.02    EGFR 107.2    Sodium 139    Potassium 3.8    Chloride 104    Calcium 9.3    Total Protein 7.0    Albumin 3.9    Globulin 3.1    Total Bilirubin 0.5    Alkaline Phosphatase 68    AST (SGOT) 37    ALT (SGPT) 41    Albumin/Globulin Ratio 1.3    BUN/Creatinine Ratio 13.7    Anion Gap 10.0      CBC w/diff          5/27/2023    10:20   CBC w/Diff   WBC 9.63    RBC 5.68    Hemoglobin 16.6    Hematocrit 47.1    MCV 82.9    MCH 29.2    MCHC 35.2    RDW 12.8    Platelets 251    Neutrophil Rel % 71.8    Immature Granulocyte Rel % 0.3    Lymphocyte Rel % 23.8    Monocyte Rel % 3.9    Eosinophil Rel % 0.1    Basophil Rel % 0.1       No results found for: TSH   No results found for: FREET4   D-Dimer, Quantitative   Date Value Ref Range Status   05/27/2023 3.72 (H) 0.00 - 0.50 MCGFEU/mL Final     Magnesium   Date Value Ref Range Status   05/27/2023 2.0 1.6 - 2.6 mg/dL Final      No results found for: DIGOXIN   Lab Results   Component Value Date    TROPONINT 19 (H) 05/27/2023             Results for orders placed in visit on 07/26/23    Adult Transthoracic Echo Complete W/ Cont if Necessary Per Protocol    Interpretation Summary  Normal left ventricular systolic function.  No significant valve abnormalities noted.         Assessment and Plan    Diagnoses and all orders for  this visit:    1. Premature atrial complexes (Primary)  Asymptomatic, advised to avoid caffeine products.  Advised adequate fluid hydration and compression socks.  He will notify office if he has return of syncopal symptoms.      Follow Up   Return in about 1 year (around 9/7/2024) for Follow up with Dr Bright.    Patient was given instructions and counseling regarding his condition or for health maintenance advice. Please see specific information pulled into the AVS if appropriate.     Landen Woodard  reports that he has never smoked. He has never used smokeless tobacco.           Livia Cardoso, RYNE  09/07/23  10:40 EDT    Dictated Utilizing Dragon Dictation

## 2023-11-07 ENCOUNTER — OFFICE VISIT (OUTPATIENT)
Dept: SLEEP MEDICINE | Facility: HOSPITAL | Age: 23
End: 2023-11-07
Payer: COMMERCIAL

## 2023-11-07 VITALS
SYSTOLIC BLOOD PRESSURE: 141 MMHG | HEART RATE: 56 BPM | OXYGEN SATURATION: 98 % | HEIGHT: 65 IN | BODY MASS INDEX: 39.74 KG/M2 | DIASTOLIC BLOOD PRESSURE: 90 MMHG | WEIGHT: 238.5 LBS

## 2023-11-07 DIAGNOSIS — E66.01 CLASS 3 SEVERE OBESITY WITH BODY MASS INDEX (BMI) OF 40.0 TO 44.9 IN ADULT, UNSPECIFIED OBESITY TYPE, UNSPECIFIED WHETHER SERIOUS COMORBIDITY PRESENT: ICD-10-CM

## 2023-11-07 DIAGNOSIS — G47.33 OBSTRUCTIVE SLEEP APNEA, ADULT: Primary | ICD-10-CM

## 2023-11-07 PROCEDURE — G0463 HOSPITAL OUTPT CLINIC VISIT: HCPCS

## 2023-11-07 NOTE — PROGRESS NOTES
[address]  [place]   [place] [address]  Phone: [phone]  Fax: [phone]        SLEEP CLINIC FOLLOW-UP PROGRESS NOTE    Landen Woodard  [id number]   2000  23 y.o.  male      PCP: Alanis Puente APRN    DATE OF VISIT: 11/7/2023          CHIEF COMPLAINT: Obstructive sleep apnea    HPI:  This is a 23 y.o. year old patient who presents to the clinic today for the management of obstructive sleep apnea.  Patient had a(n) sleep study around 2017 showing obstructive sleep apnea with AHI of 12.1/hr. he saw ENT and was not felt to have significant tonsillar hypertrophy that would be contributing to airway issues.  CPAP trial was recommended and if patient failed CPAP then ENT would consider drug-induced sleep endoscopy.  Patient was seen in the office 8/2023 at which point he continued to have symptoms of sleep apnea and weight was unchanged and it was felt to be unlikely that his sleep apnea had improved and he declined follow-up sleep study.  After discussion of options patient wanted to proceed with PAP therapy.  Now presents today for compliance check.  Patient reports he is tolerating CPAP well.  Not bothersome.  However is restless in his sleep and will wake up and has taken mask off while sleeping without realizing it.  More recently had a tooth pulled and was told he had to go without using CPAP for a week, was told he can resume use tonight.  Likes mask and feels pressures ok.  Discussed leak alarm and increasing usage.  Feels he is sleeping better with PAP.            MEDICATIONS: reviewed     ALLERGIES:  Patient has no known allergies.    SOCIAL HISTORY (habits pertaining to sleep medicine):  History tobacco use: No   History of alcohol use: 0 per week  Caffeine use: 2     REVIEW OF SYSTEMS:   Pertinent positive symptoms are:  [place] Sleepiness Scale :Total score: 0           PHYSICAL EXAMINATION:  CONSTITUTIONAL:  Vitals:    11/07/23 0900   BP: 141/90  "  Pulse: 56   SpO2: 98%   Weight: 108 kg (238 lb 8 oz)   Height: 165.1 cm (65\")    Body mass index is 39.69 kg/m².   HEAD: atraumatic, normocephalic  RESP SYSTEM: not in respiratory distress, breathing unlabored  CARDIOVASULAR: normal rate, no edema noted   NEURO: Alert and oriented x 3, mood and affect appeared appropriate      DATA REVIEWED:  The Smart card downloaded on 10/23/23 has been reviewed independently by me for compliance and discussed the data with the patient.   Compliance:  73%  More than 4 hr use: 17%  Average use of the device: 3hr 3 min per night  Residual AHI: 0.4 /hr (goal < 5.0 /hr)  Mask type: Full face  Device: Resmed airsense 11  DME: [employer]            ASSESSMENT AND PLAN:  Obstructive Sleep Apnea (G 47.33): sleep apnea symptoms have improved with the device and treatment.  Patient aware of need to increase usage, at least 4 hours at least 70% of time for insurance, all night every night preferred.  I have personally reviewed the smart card download and discussed the download data with the patient and encouarged continued use of the device.  The residual AHI is acceptable. The device is benefiting the patient and the device is medically necessary.  Without adequate treatment of sleep apnea and without good compliance, patient would be at increased risk of hypertension, diabetes mellitus, pulmonary hypertension, atrial fibrillation, stroke, and nonrestorative sleep with hypersomnia which could increase risk of motor vehicle accidents. The patient is also instructed to get the supplies from the [employer] company and and change them on a regular basis.  A prescription for supplies has been sent to the [employer] company.  I have also discussed with this patient the importance of good sleep hygiene and getting an adequate amount of sleep to aid in overall good health. He will turn on leak alarm and increase usage, all night every night if possible.  Follow-up 4-6 weeks or sooner for issues or concerns.  "   Obesity: Body mass index is 39.69 kg/m².. Patients who are overweight or obese are at increased risk of sleep apnea/ sleep disordered breathing. Weight reduction and healthy lifestyle are encouraged in overweight/ obese patients as part of a comprehensive approach to sleep apnea treatment.    Premature atrial contractions: Follows with cardiology  History of syncope  Elevated blood pressure reading      Patient will follow-up in 4-6 weeks or follow-up sooner for any issues or concerns.  Patient's questions were answered.          Thank you for allowing me to participate in the care of this patient.     Lilian Toussaint DNP, APRN  [facility]

## 2023-12-11 ENCOUNTER — OFFICE VISIT (OUTPATIENT)
Dept: SLEEP MEDICINE | Facility: HOSPITAL | Age: 23
End: 2023-12-11
Payer: COMMERCIAL

## 2023-12-11 VITALS
HEART RATE: 62 BPM | HEIGHT: 65 IN | BODY MASS INDEX: 40.64 KG/M2 | DIASTOLIC BLOOD PRESSURE: 78 MMHG | OXYGEN SATURATION: 97 % | SYSTOLIC BLOOD PRESSURE: 136 MMHG | WEIGHT: 243.9 LBS

## 2023-12-11 DIAGNOSIS — G47.33 OBSTRUCTIVE SLEEP APNEA, ADULT: Primary | ICD-10-CM

## 2023-12-11 DIAGNOSIS — E66.01 CLASS 3 SEVERE OBESITY WITH BODY MASS INDEX (BMI) OF 40.0 TO 44.9 IN ADULT, UNSPECIFIED OBESITY TYPE, UNSPECIFIED WHETHER SERIOUS COMORBIDITY PRESENT: ICD-10-CM

## 2023-12-11 PROCEDURE — G0463 HOSPITAL OUTPT CLINIC VISIT: HCPCS

## 2024-02-01 ENCOUNTER — OFFICE VISIT (OUTPATIENT)
Dept: FAMILY MEDICINE CLINIC | Facility: CLINIC | Age: 24
End: 2024-02-01
Payer: COMMERCIAL

## 2024-02-01 VITALS
RESPIRATION RATE: 18 BRPM | HEIGHT: 65 IN | OXYGEN SATURATION: 98 % | HEART RATE: 75 BPM | WEIGHT: 246.8 LBS | TEMPERATURE: 97.8 F | SYSTOLIC BLOOD PRESSURE: 128 MMHG | DIASTOLIC BLOOD PRESSURE: 77 MMHG | BODY MASS INDEX: 41.12 KG/M2

## 2024-02-01 DIAGNOSIS — G47.33 OSA ON CPAP: Primary | ICD-10-CM

## 2024-02-01 DIAGNOSIS — I49.1 PAC (PREMATURE ATRIAL CONTRACTION): ICD-10-CM

## 2024-02-01 PROCEDURE — 99213 OFFICE O/P EST LOW 20 MIN: CPT

## 2024-06-10 NOTE — PROGRESS NOTES
"  [address] [address]  Phone: [phone]  Fax: [phone]        SLEEP CLINIC FOLLOW-UP PROGRESS NOTE    Landen Woodard  [id number]   2000  23 y.o.  male      PCP: Alanis Puente APRN    DATE OF VISIT: 6/11/2024          CHIEF COMPLAINT: Obstructive sleep apnea    HPI:  This is a 23 y.o. year old patient who presents to the clinic today for the management of obstructive sleep apnea.   Patient had a sleep study in 2017 showing obstructive sleep apnea with AHI of 12.1/hr. he reported he had seen ENT and was told he was not a candidate to have tonsils removed and CPAP therapy was recommended.  This patient is using positive airway pressure therapy with auto CPAP.  Patient felt symptoms improved with PAP, sleeping better, waking up feeling more rested.  Presents today for 6-month follow-up.  He switched to the P10 nasal mask and is doing well with this.  He does have some mask leak and we discussed getting chinstrap through [employer].  We also discussed increasing usage of device, recommend trying to use all night every night.          MEDICATIONS: reviewed     ALLERGIES:  Patient has no known allergies.    SOCIAL HISTORY (habits pertaining to sleep medicine):  Tobacco use: No   Alcohol use:   Caffeine use: 2     REVIEW OF SYSTEMS:   Pertinent positive symptoms are:  [place] Sleepiness Scale :Total score: 0           PHYSICAL EXAMINATION:  CONSTITUTIONAL:  Vitals:    06/11/24 1100   Pulse: 99   SpO2: 98%   Weight: 111 kg (244 lb 6.4 oz)   Height: 165.1 cm (65\")    Body mass index is 40.67 kg/m².   HEAD: atraumatic, normocephalic  RESP SYSTEM: not in respiratory distress, breathing unlabored  CARDIOVASULAR: normal rate, no edema noted   NEURO: Alert and oriented x 3, mood and affect appeared appropriate      DATA REVIEWED:  The PAP compliance summary downloaded on 6/6/2024 has been reviewed independently by me and discussed with the patient. "   Compliance: 97%  More than 4 hr use: 63%  Average use of the device: 4 hours 15 minutes per night  Residual AHI: 0.3 /hr (goal < 5.0 /hr)  Device: ResMed AirSense 11  Mask type: Fullface  DME: [employer]          ASSESSMENT AND PLAN:  Obstructive Sleep Apnea: sleep apnea has improved with the device and treatment.  Patient has excellent compliance with the device for treatment of sleep apnea.  I have personally reviewed the smart card download and discussed the download data with the patient and encouarged continued use of the device.  The residual AHI is acceptable. The device is benefiting the patient and the device is medically necessary.  Recommend patient get supplies from the DME company, change them on a regular basis, and clean as directed.  A prescription for supplies has been sent to the DME company.  Recommend continued usage of PAP device, most insurances require minimum usage of 4 hours per night at least 70% of the time, would recommend using all night every night if possible for optimum health.  Recommend prioritizing sleep to aid in overall health.  He will call [employer] to let them know which nasal mask he switched to and also to get chinstrap.  Recommended increasing usage, trying to use all night every night if possible for optimum health.  Obesity: Body mass index is 40.67 kg/m².. Patients who are overweight or obese are at increased risk of sleep apnea/ sleep disordered breathing. Weight reduction and healthy lifestyle are encouraged in overweight/ obese patients as part of a comprehensive approach to sleep apnea treatment.        Patient will follow-up in 6 months or follow-up sooner for any issues or concerns.  Patient's questions were answered.          Thank you for allowing me to participate in the care of this patient.     Lilian Toussaint, GUNNER, APRN  [facility]

## 2024-06-11 ENCOUNTER — OFFICE VISIT (OUTPATIENT)
Dept: SLEEP MEDICINE | Facility: HOSPITAL | Age: 24
End: 2024-06-11
Payer: COMMERCIAL

## 2024-06-11 VITALS — HEIGHT: 65 IN | WEIGHT: 244.4 LBS | OXYGEN SATURATION: 98 % | BODY MASS INDEX: 40.72 KG/M2 | HEART RATE: 99 BPM

## 2024-06-11 DIAGNOSIS — G47.33 OBSTRUCTIVE SLEEP APNEA, ADULT: Primary | ICD-10-CM

## 2024-06-11 DIAGNOSIS — E66.01 CLASS 3 SEVERE OBESITY WITH BODY MASS INDEX (BMI) OF 40.0 TO 44.9 IN ADULT, UNSPECIFIED OBESITY TYPE, UNSPECIFIED WHETHER SERIOUS COMORBIDITY PRESENT: ICD-10-CM

## 2024-06-11 PROCEDURE — G0463 HOSPITAL OUTPT CLINIC VISIT: HCPCS

## 2024-06-11 PROCEDURE — 99213 OFFICE O/P EST LOW 20 MIN: CPT | Performed by: NURSE PRACTITIONER

## 2024-09-07 NOTE — PROGRESS NOTES
[facility]  Cardiology progress Note    Patient Name: Landen Woodard  : 2000    CHIEF COMPLAINT  Vasovagal syncope        Subjective   Subjective     HISTORY OF PRESENT ILLNESS    Landen Woodard is a 23 y.o. male with history of vasovagal syncope.  No further episodes.    REVIEW OF SYSTEMS    Constitutional:    No fever, no weight loss  Skin:     No rash  Otolaryngeal:    No difficulty swallowing  Cardiovascular: See HPI.  Pulmonary:    No cough, no sputum production    Personal History     Social History:    reports that he has never smoked. He has never used smokeless tobacco. He reports that he does not drink alcohol and does not use drugs.    Home Medications:  Current Outpatient Medications on File Prior to Visit   Medication Sig    hydrOXYzine (ATARAX) 10 MG tablet Take 1 tablet by mouth Every 8 (Eight) Hours As Needed for Anxiety.     No current facility-administered medications on file prior to visit.       Past Medical History:   Diagnosis Date    Anxiety     Sleep apnea 18       Allergies:  No Known Allergies    Objective    Objective       Vitals:   Heart Rate:  [80] 80  BP: (126)/(70) 126/70  Body mass index is 42.27 kg/m².     PHYSICAL EXAM:    General Appearance:   well developed  well nourished  HENT:   oropharynx moist  lips not cyanotic  Neck:  thyroid not enlarged  supple  Respiratory:  no respiratory distress  normal breath sounds  no rales  Cardiovascular:  no jugular venous distention  regular rhythm  apical impulse normal  S1 normal, S2 normal  no S3, no S4   no murmur  no rub, no thrill  carotid pulses normal; no bruit  pedal pulses normal  lower extremity edema: none    Skin:   warm, dry  Psychiatric:  judgement and insight appropriate  normal mood and affect        Result Review:  I have personally reviewed the available results from  [x]  Laboratory  [x]  EKG  [x]  Cardiology  [x]  Medications  [x]  Old records  []  Other:     Procedures    Results for orders  placed in visit on 07/26/23    Adult Transthoracic Echo Complete W/ Cont if Necessary Per Protocol    Interpretation Summary  Normal left ventricular systolic function.  No significant valve abnormalities noted.     Impression/Plan:  1.  Vasovagal syncope: Cardiac workup negative.  Normal echo.  Follow-up with PMD.           Sergo Bright MD   09/10/24   09:29 EDT

## 2024-09-10 ENCOUNTER — OFFICE VISIT (OUTPATIENT)
Dept: CARDIOLOGY | Facility: CLINIC | Age: 24
End: 2024-09-10
Payer: COMMERCIAL

## 2024-09-10 VITALS
WEIGHT: 254 LBS | SYSTOLIC BLOOD PRESSURE: 126 MMHG | DIASTOLIC BLOOD PRESSURE: 70 MMHG | BODY MASS INDEX: 42.32 KG/M2 | HEIGHT: 65 IN | HEART RATE: 80 BPM

## 2024-09-10 DIAGNOSIS — R55 VASOVAGAL SYNCOPE: Primary | ICD-10-CM

## 2024-09-10 PROCEDURE — 99212 OFFICE O/P EST SF 10 MIN: CPT | Performed by: SPECIALIST

## 2024-09-16 ENCOUNTER — OFFICE VISIT (OUTPATIENT)
Dept: SLEEP MEDICINE | Facility: HOSPITAL | Age: 24
End: 2024-09-16
Payer: COMMERCIAL

## 2024-09-16 VITALS — WEIGHT: 253.2 LBS | BODY MASS INDEX: 42.19 KG/M2 | HEIGHT: 65 IN | OXYGEN SATURATION: 99 % | HEART RATE: 69 BPM

## 2024-09-16 DIAGNOSIS — G47.33 OBSTRUCTIVE SLEEP APNEA, ADULT: Primary | ICD-10-CM

## 2024-09-16 DIAGNOSIS — E66.01 SEVERE OBESITY (BMI >= 40): ICD-10-CM

## 2024-09-16 PROCEDURE — G0463 HOSPITAL OUTPT CLINIC VISIT: HCPCS

## 2024-09-16 PROCEDURE — 99213 OFFICE O/P EST LOW 20 MIN: CPT | Performed by: NURSE PRACTITIONER

## 2024-12-30 RX ORDER — IBUPROFEN 800 MG/1
800 TABLET, FILM COATED ORAL EVERY 12 HOURS PRN
Qty: 60 TABLET | Refills: 2 | Status: SHIPPED | OUTPATIENT
Start: 2024-12-30

## 2025-02-04 ENCOUNTER — OFFICE VISIT (OUTPATIENT)
Dept: FAMILY MEDICINE CLINIC | Facility: CLINIC | Age: 25
End: 2025-02-04
Payer: COMMERCIAL

## 2025-02-04 VITALS
BODY MASS INDEX: 43.39 KG/M2 | WEIGHT: 260.4 LBS | HEART RATE: 66 BPM | HEIGHT: 65 IN | SYSTOLIC BLOOD PRESSURE: 124 MMHG | OXYGEN SATURATION: 97 % | DIASTOLIC BLOOD PRESSURE: 82 MMHG | TEMPERATURE: 98.1 F

## 2025-02-04 DIAGNOSIS — Z00.00 ANNUAL PHYSICAL EXAM: Primary | ICD-10-CM

## 2025-02-04 DIAGNOSIS — Z11.59 ENCOUNTER FOR HEPATITIS C SCREENING TEST FOR LOW RISK PATIENT: ICD-10-CM

## 2025-02-04 DIAGNOSIS — G47.33 OSA ON CPAP: ICD-10-CM

## 2025-02-04 DIAGNOSIS — Z13.220 SCREENING FOR LIPID DISORDERS: ICD-10-CM

## 2025-02-04 DIAGNOSIS — I49.1 PAC (PREMATURE ATRIAL CONTRACTION): ICD-10-CM

## 2025-02-04 PROCEDURE — 99395 PREV VISIT EST AGE 18-39: CPT

## 2025-02-04 NOTE — PROGRESS NOTES
"Landen Woodard presents to [facility] who presents to the clinic for annual physical.       History of Present Illness  This is a 24-year-old male who presents to clinic for annual physical.    Obstructive sleep apnea: Patient states that he is doing really well.  Has followed up with sleep medicine and actually sees them again in March.  States he is doing well on his CPAP machine and actually cannot sleep without it and its made a tremendous difference in his fatigue throughout the day.  Patient states that he is doing great.  No issues.    Vasovagal syncope/PAC: Patient states that he is also been discharged from cardiology as that he did full workup and did not find anything concerning.  States that he is doing well and has not had any more events that he had had previously.  No new concerns to report.    Patient denies any other symptoms or concerns today.  States that he is doing well.  Continues to use ibuprofen as needed, continues to hydroxyzine as needed as well.    Patient is due for full panel blood work, which I will order today.  Refuses vaccine/immunizations but otherwise up-to-date on preventative screenings.    The following portions of the patient's history were personally reviewed and updated as appropriate: allergies, current medications, past medical history, past surgical history, past family history, and past social history.       Objective   Vital Signs:   /82 (BP Location: Left arm, Patient Position: Sitting, Cuff Size: Adult)   Pulse 66   Temp 98.1 °F (36.7 °C)   Ht 165.1 cm (65\")   Wt 118 kg (260 lb 6.4 oz)   SpO2 97%   BMI 43.33 kg/m²     Body mass index is 43.33 kg/m².    All labs, imaging, test results, and specialty provider notes reviewed with patient.     Physical Exam  Vitals reviewed.   Constitutional:       Appearance: Normal appearance.   Cardiovascular:      Rate and Rhythm: Normal rate and regular rhythm.      Pulses: Normal " pulses.      Heart sounds: Normal heart sounds.   Pulmonary:      Effort: Pulmonary effort is normal.      Breath sounds: Normal breath sounds.   Neurological:      General: No focal deficit present.      Mental Status: He is alert and oriented to person, place, and time.                  Class 3 Severe Obesity (BMI >=40). Obesity-related health conditions include the following: obstructive sleep apnea. Obesity is unchanged. BMI is is above average; BMI management plan is completed. We discussed portion control and increasing exercise.            Assessment and Plan:  Diagnoses and all orders for this visit:    1. Annual physical exam (Primary)  -     CBC Auto Differential; Future  -     Comprehensive Metabolic Panel; Future  -     TSH Rfx On Abnormal To Free T4; Future  -     Urinalysis With Culture If Indicated -; Future    2. Screening for lipid disorders  -     Lipid Panel; Future    3. RAMA on CPAP  Comments:  Stable and doing well.  Continue to follow-up with sleep medicine.    4. PAC (premature atrial contraction)  Comments:  No longer an issue, continue to follow-up with cardiology if needed.    5. Encounter for hepatitis C screening test for low risk patient  -     Hepatitis C antibody; Future      Anticipatory Guidelines discussed with patient.     Discussed getting adequate exercise, reduced TV/electronic time, car safety including seat belt use, sexual activity (if applicable), and smoking/alcohol use (if applicable).    Follow Up:  Return in about 1 year (around 2/4/2026) for Annual physical.    Patient was given instructions and counseling regarding his condition or for health maintenance advice. Please see specific information pulled into the AVS if appropriate.

## 2025-02-05 ENCOUNTER — LAB (OUTPATIENT)
Dept: LAB | Facility: HOSPITAL | Age: 25
End: 2025-02-05
Payer: COMMERCIAL

## 2025-02-05 DIAGNOSIS — Z00.00 ANNUAL PHYSICAL EXAM: ICD-10-CM

## 2025-02-05 DIAGNOSIS — Z11.59 ENCOUNTER FOR HEPATITIS C SCREENING TEST FOR LOW RISK PATIENT: ICD-10-CM

## 2025-02-05 DIAGNOSIS — Z13.220 SCREENING FOR LIPID DISORDERS: ICD-10-CM

## 2025-02-05 LAB
ALBUMIN SERPL-MCNC: 4.4 G/DL (ref 3.5–5.2)
ALBUMIN/GLOB SERPL: 1.4 G/DL
ALP SERPL-CCNC: 78 U/L (ref 39–117)
ALT SERPL W P-5'-P-CCNC: 38 U/L (ref 1–41)
ANION GAP SERPL CALCULATED.3IONS-SCNC: 10 MMOL/L (ref 5–15)
AST SERPL-CCNC: 45 U/L (ref 1–40)
BASOPHILS # BLD AUTO: 0.03 10*3/MM3 (ref 0–0.2)
BASOPHILS NFR BLD AUTO: 0.4 % (ref 0–1.5)
BILIRUB SERPL-MCNC: 0.3 MG/DL (ref 0–1.2)
BILIRUB UR QL STRIP: NEGATIVE
BUN SERPL-MCNC: 11 MG/DL (ref 6–20)
BUN/CREAT SERPL: 9.5 (ref 7–25)
CALCIUM SPEC-SCNC: 9.5 MG/DL (ref 8.6–10.5)
CHLORIDE SERPL-SCNC: 105 MMOL/L (ref 98–107)
CHOLEST SERPL-MCNC: 198 MG/DL (ref 0–200)
CLARITY UR: CLEAR
CO2 SERPL-SCNC: 24 MMOL/L (ref 22–29)
COLOR UR: YELLOW
CREAT SERPL-MCNC: 1.16 MG/DL (ref 0.76–1.27)
DEPRECATED RDW RBC AUTO: 38.7 FL (ref 37–54)
EGFRCR SERPLBLD CKD-EPI 2021: 90.8 ML/MIN/1.73
EOSINOPHIL # BLD AUTO: 0.04 10*3/MM3 (ref 0–0.4)
EOSINOPHIL NFR BLD AUTO: 0.6 % (ref 0.3–6.2)
ERYTHROCYTE [DISTWIDTH] IN BLOOD BY AUTOMATED COUNT: 12.7 % (ref 12.3–15.4)
GLOBULIN UR ELPH-MCNC: 3.1 GM/DL
GLUCOSE SERPL-MCNC: 88 MG/DL (ref 65–99)
GLUCOSE UR STRIP-MCNC: NEGATIVE MG/DL
HCT VFR BLD AUTO: 46.5 % (ref 37.5–51)
HCV AB SER QL: NORMAL
HDLC SERPL-MCNC: 38 MG/DL (ref 40–60)
HGB BLD-MCNC: 16.4 G/DL (ref 13–17.7)
HGB UR QL STRIP.AUTO: NEGATIVE
HOLD SPECIMEN: NORMAL
IMM GRANULOCYTES # BLD AUTO: 0.01 10*3/MM3 (ref 0–0.05)
IMM GRANULOCYTES NFR BLD AUTO: 0.1 % (ref 0–0.5)
KETONES UR QL STRIP: NEGATIVE
LDLC SERPL CALC-MCNC: 128 MG/DL (ref 0–100)
LDLC/HDLC SERPL: 3.28 {RATIO}
LEUKOCYTE ESTERASE UR QL STRIP.AUTO: NEGATIVE
LYMPHOCYTES # BLD AUTO: 2.77 10*3/MM3 (ref 0.7–3.1)
LYMPHOCYTES NFR BLD AUTO: 39 % (ref 19.6–45.3)
MCH RBC QN AUTO: 29.7 PG (ref 26.6–33)
MCHC RBC AUTO-ENTMCNC: 35.3 G/DL (ref 31.5–35.7)
MCV RBC AUTO: 84.2 FL (ref 79–97)
MONOCYTES # BLD AUTO: 0.5 10*3/MM3 (ref 0.1–0.9)
MONOCYTES NFR BLD AUTO: 7 % (ref 5–12)
NEUTROPHILS NFR BLD AUTO: 3.76 10*3/MM3 (ref 1.7–7)
NEUTROPHILS NFR BLD AUTO: 52.9 % (ref 42.7–76)
NITRITE UR QL STRIP: NEGATIVE
NRBC BLD AUTO-RTO: 0 /100 WBC (ref 0–0.2)
PH UR STRIP.AUTO: 6 [PH] (ref 5–8)
PLATELET # BLD AUTO: 302 10*3/MM3 (ref 140–450)
PMV BLD AUTO: 10.3 FL (ref 6–12)
POTASSIUM SERPL-SCNC: 4 MMOL/L (ref 3.5–5.2)
PROT SERPL-MCNC: 7.5 G/DL (ref 6–8.5)
PROT UR QL STRIP: NEGATIVE
RBC # BLD AUTO: 5.52 10*6/MM3 (ref 4.14–5.8)
SODIUM SERPL-SCNC: 139 MMOL/L (ref 136–145)
SP GR UR STRIP: 1.02 (ref 1–1.03)
TRIGL SERPL-MCNC: 177 MG/DL (ref 0–150)
TSH SERPL DL<=0.05 MIU/L-ACNC: 2.53 UIU/ML (ref 0.27–4.2)
UROBILINOGEN UR QL STRIP: NORMAL
VLDLC SERPL-MCNC: 32 MG/DL (ref 5–40)
WBC NRBC COR # BLD AUTO: 7.11 10*3/MM3 (ref 3.4–10.8)

## 2025-02-05 PROCEDURE — 36415 COLL VENOUS BLD VENIPUNCTURE: CPT

## 2025-02-05 PROCEDURE — 86803 HEPATITIS C AB TEST: CPT

## 2025-02-05 PROCEDURE — 81003 URINALYSIS AUTO W/O SCOPE: CPT

## 2025-02-05 PROCEDURE — 84443 ASSAY THYROID STIM HORMONE: CPT

## 2025-02-05 PROCEDURE — 80061 LIPID PANEL: CPT

## 2025-02-05 PROCEDURE — 85025 COMPLETE CBC W/AUTO DIFF WBC: CPT

## 2025-02-05 PROCEDURE — 80053 COMPREHEN METABOLIC PANEL: CPT

## 2025-03-17 ENCOUNTER — OFFICE VISIT (OUTPATIENT)
Dept: SLEEP MEDICINE | Facility: HOSPITAL | Age: 25
End: 2025-03-17
Payer: COMMERCIAL

## 2025-03-17 VITALS
BODY MASS INDEX: 42.4 KG/M2 | SYSTOLIC BLOOD PRESSURE: 135 MMHG | HEIGHT: 65 IN | DIASTOLIC BLOOD PRESSURE: 63 MMHG | OXYGEN SATURATION: 97 % | WEIGHT: 254.5 LBS | HEART RATE: 57 BPM

## 2025-03-17 DIAGNOSIS — G47.33 OSA (OBSTRUCTIVE SLEEP APNEA): Primary | ICD-10-CM

## 2025-03-17 PROCEDURE — G0463 HOSPITAL OUTPT CLINIC VISIT: HCPCS

## 2025-03-17 PROCEDURE — 99213 OFFICE O/P EST LOW 20 MIN: CPT | Performed by: STUDENT IN AN ORGANIZED HEALTH CARE EDUCATION/TRAINING PROGRAM

## 2025-03-17 NOTE — PROGRESS NOTES
"  [facility]    SLEEP CLINIC FOLLOW UP PROGRESS NOTE.    Landen Woodard  [id number]   2000  24 y.o.  male      PCP: Alanis Puente APRN    Date of visit: 3/17/2025    No chief complaint on file.      HPI:  This is a 24 y.o. years old patient is here for the management of obstructive sleep apnea.    Patient Goes to bed at 9 PM and gets up at 6 AM. Wakes up 0 times per night.    Patient had a sleep study in 2017 showing obstructive sleep apnea with AHI of 12.1/hr. he reported he had seen ENT and was told he was not a candidate to have tonsils removed and CPAP therapy was recommended. This patient is using positive airway pressure therapy with auto CPAP.    He reports CPAP use is going well and he is not having issues with the machine.  He does sometimes have his headgear get displaced at nighttime.  He is using nasal pillows and tolerating well.    ESS: 1    PAP download data dates February 5 through March 6, 2025  Device: AirSense 11 AutoSet  Mask type: Nasal pillow  Pressure : 8-16, 95th percentile pressure 10.7  % days used >4 hours: 87  Average usage days used: 7 hours 27 minutes  AHI: 1.3  Median leak: 0.4  95th % leak 4.0  DME supplier: [employer]    SOCIAL (habits pertaining to sleep medicine)  History tobacco use: None  History of alcohol use: None per week  Caffeine use: 2 drinks per day      REVIEW OF SYSTEMS:   Negative for shortness of breath, dry mouth      PHYSICAL EXAMINATION:  CONSTITUTIONAL:  Vitals:    03/17/25 0900   BP: 135/63   Pulse: 57   SpO2: 97%   Weight: 115 kg (254 lb 8 oz)   Height: 165.1 cm (65\")    Body mass index is 42.35 kg/m².   RESP SYSTEM: No respiratory distress  CARDIOVASULAR: Regular rate  NEURO: Answers questions appropriately                ASSESSMENT AND PLAN:  Obstructive sleep apnea ( G 47.33)  I have independently reviewed the PAP compliance data and discussed with the patient the download data and encouarged the patient to continue to use the " device. The device is benefiting the patient and the device is medically necessary.  The patient is also instructed to get the supplies from the DME company and and change them on a regular basis.  A prescription for supplies has been sent to the DME company.    He has an AHI of 1.3 with no significant leak. Continue current therapy. Follow up 1 year or sooner if has problems.    Return in about 1 year (around 3/17/2026). . Patient's questions were answered.      Jake Dang, DO